# Patient Record
Sex: MALE | Race: WHITE | Employment: FULL TIME | URBAN - METROPOLITAN AREA
[De-identification: names, ages, dates, MRNs, and addresses within clinical notes are randomized per-mention and may not be internally consistent; named-entity substitution may affect disease eponyms.]

---

## 2019-09-13 ENCOUNTER — HOSPITAL ENCOUNTER (OUTPATIENT)
Dept: ULTRASOUND IMAGING | Age: 64
Discharge: HOME | End: 2019-09-13
Attending: FAMILY MEDICINE
Payer: COMMERCIAL

## 2019-09-13 DIAGNOSIS — R22.1 LOCALIZED SWELLING, MASS AND LUMP, NECK: ICD-10-CM

## 2019-09-13 PROCEDURE — 76536 US EXAM OF HEAD AND NECK: CPT

## 2019-09-26 ENCOUNTER — HOSPITAL ENCOUNTER (OUTPATIENT)
Dept: CT IMAGING | Age: 64
Discharge: HOME | End: 2019-09-26
Attending: FAMILY MEDICINE
Payer: COMMERCIAL

## 2019-09-26 DIAGNOSIS — R22.1 NECK MASS: ICD-10-CM

## 2019-09-26 LAB — CREAT UR-MCNC: 0.9 MG/DL (ref 0.6–1.3)

## 2019-09-26 PROCEDURE — 82565 ASSAY OF CREATININE: CPT

## 2019-09-26 PROCEDURE — 74011636320 HC RX REV CODE- 636/320

## 2019-09-26 PROCEDURE — 70491 CT SOFT TISSUE NECK W/DYE: CPT

## 2019-09-26 RX ADMIN — IOPAMIDOL 80 ML: 612 INJECTION, SOLUTION INTRAVENOUS at 16:22

## 2020-09-04 ENCOUNTER — HOSPITAL ENCOUNTER (OUTPATIENT)
Dept: LAB | Age: 65
Discharge: HOME | End: 2020-09-04

## 2020-09-04 LAB — SENTARA SPECIMEN COL,SENBCF: NORMAL

## 2020-09-04 PROCEDURE — 99001 SPECIMEN HANDLING PT-LAB: CPT

## 2021-05-26 ENCOUNTER — HOSPITAL ENCOUNTER (OUTPATIENT)
Dept: VASCULAR SURGERY | Age: 66
Discharge: HOME | End: 2021-05-26
Payer: COMMERCIAL

## 2021-05-26 ENCOUNTER — TRANSCRIBE ORDER (OUTPATIENT)
Dept: SCHEDULING | Age: 66
End: 2021-05-26

## 2021-05-26 DIAGNOSIS — R60.0 LOCALIZED EDEMA: Primary | ICD-10-CM

## 2021-05-26 DIAGNOSIS — R60.0 LOCALIZED EDEMA: ICD-10-CM

## 2021-05-26 PROCEDURE — 93971 EXTREMITY STUDY: CPT

## 2021-09-07 ENCOUNTER — OFFICE VISIT (OUTPATIENT)
Dept: VASCULAR SURGERY | Age: 66
End: 2021-09-07

## 2021-09-07 VITALS
HEIGHT: 75 IN | BODY MASS INDEX: 30.46 KG/M2 | SYSTOLIC BLOOD PRESSURE: 138 MMHG | WEIGHT: 245 LBS | DIASTOLIC BLOOD PRESSURE: 82 MMHG | HEART RATE: 49 BPM | OXYGEN SATURATION: 97 %

## 2021-09-07 DIAGNOSIS — I82.491 DEEP VEIN THROMBOSIS (DVT) OF OTHER VEIN OF RIGHT LOWER EXTREMITY, UNSPECIFIED CHRONICITY (HCC): Primary | ICD-10-CM

## 2021-09-07 PROCEDURE — 99203 OFFICE O/P NEW LOW 30 MIN: CPT | Performed by: NURSE PRACTITIONER

## 2021-09-07 RX ORDER — OMEPRAZOLE 40 MG/1
40 CAPSULE, DELAYED RELEASE ORAL DAILY
COMMUNITY
Start: 2021-07-01

## 2021-09-07 RX ORDER — SILDENAFIL 100 MG/1
TABLET, FILM COATED ORAL
COMMUNITY

## 2021-09-07 RX ORDER — CLOTRIMAZOLE AND BETAMETHASONE DIPROPIONATE 10; .64 MG/G; MG/G
CREAM TOPICAL
COMMUNITY

## 2021-09-07 RX ORDER — HYDROCHLOROTHIAZIDE 12.5 MG/1
CAPSULE ORAL
COMMUNITY

## 2021-09-07 RX ORDER — DIAZEPAM 5 MG/1
TABLET ORAL
COMMUNITY

## 2021-09-07 RX ORDER — METOPROLOL SUCCINATE 50 MG/1
TABLET, EXTENDED RELEASE ORAL
COMMUNITY

## 2021-09-07 RX ORDER — SERTRALINE HYDROCHLORIDE 25 MG/1
TABLET, FILM COATED ORAL
COMMUNITY

## 2021-09-07 RX ORDER — LOSARTAN POTASSIUM 100 MG/1
TABLET ORAL
COMMUNITY

## 2021-09-07 RX ORDER — LOSARTAN POTASSIUM AND HYDROCHLOROTHIAZIDE 12.5; 1 MG/1; MG/1
TABLET ORAL
COMMUNITY

## 2021-09-07 NOTE — PROGRESS NOTES
1. Have you been to an emergency room or urgent care clinic since your last visit? Yes, [facility]     Hospitalized since your last visit? If yes, where, when, and reason for visit? No  2. Have you seen or consulted any other health care providers outside of the [facility] since your last visit including any procedures, health maintenance items. If yes, where, when and reason for visit?  Yes, [facility] ( [facility], over a week ago)

## 2021-10-06 LAB
LEFT CFV RFX: 1.2 S
RIGHT CFV RFX: 2.3 S
RIGHT FV RFX: 2.2 S

## 2021-10-07 DIAGNOSIS — I82.491 DEEP VEIN THROMBOSIS (DVT) OF OTHER VEIN OF RIGHT LOWER EXTREMITY, UNSPECIFIED CHRONICITY (HCC): ICD-10-CM

## 2021-10-08 ENCOUNTER — OFFICE VISIT (OUTPATIENT)
Dept: VASCULAR SURGERY | Age: 66
End: 2021-10-08
Payer: COMMERCIAL

## 2021-10-08 VITALS
HEART RATE: 48 BPM | RESPIRATION RATE: 20 BRPM | DIASTOLIC BLOOD PRESSURE: 80 MMHG | SYSTOLIC BLOOD PRESSURE: 138 MMHG | OXYGEN SATURATION: 100 %

## 2021-10-08 DIAGNOSIS — I82.491 DEEP VEIN THROMBOSIS (DVT) OF OTHER VEIN OF RIGHT LOWER EXTREMITY, UNSPECIFIED CHRONICITY (HCC): Primary | ICD-10-CM

## 2021-10-08 PROCEDURE — 99213 OFFICE O/P EST LOW 20 MIN: CPT | Performed by: NURSE PRACTITIONER

## 2021-10-08 NOTE — PROGRESS NOTES
1. Have you been to an emergency room or urgent care clinic since your last visit? No     Hospitalized since your last visit? If yes, where, when, and reason for visit? No     2. Have you seen or consulted any other health care providers outside of the [facility] since your last visit including any procedures, health maintenance items. If yes, where, when and reason for visit?  No             3 most recent PHQ Screens 10/8/2021   Little interest or pleasure in doing things Not at all   Feeling down, depressed, irritable, or hopeless Not at all   Total Score PHQ 2 0

## 2021-10-08 NOTE — PROGRESS NOTES
Chief Complaint   Patient presents with    Blood Clot         Impression and Plan:  60 y.o. male with provoked RLE DVTs. The DVTs are now chronic. There is deep venous reflux in the posterior tibial and femoral veins. Continue with RLE knee-high compression 20 mmHg to 30 mmHg  Patient made aware that he needs to quit smoking and take breaks every hour and 1/2 to 2 hours on long trips to prevent any other provoked DVTs. We did discuss the signs and symptoms of post thrombotic syndrome which is most likely cause of his edema. Discontinue the the Xarelto. History and Physical    Valerio Vega is a 60y.o. year old male here for review of his RLE PVL DVT rule out. Today he does have complaints of right leg swelling which is alleviated with compression. 9/7/2021 ultrasound   Chronic non-occlusive on present right femoral vein the proximal distal leg lengths, also noted in 1 of two-point tibial veins. Deep venous insufficiency demonstrated the common femoral vein femoral posterior tibial veins right. Triphasic posterior tibial artery noted.     Past Medical History:   Diagnosis Date    Asthma     GERD (gastroesophageal reflux disease)     Hypertension     Thromboembolus ([address]      There is no problem list on file for this patient.     Past Surgical History:   Procedure Laterality Date    HX COLONOSCOPY       Current Outpatient Medications   Medication Sig Dispense Refill    clotrimazole-betamethasone (LOTRISONE) topical cream clotrimazole-betamethasone 1 %-0.05 % topical cream      diazePAM (VALIUM) 5 mg tablet diazepam 5 mg tablet   TAKE 1 TABLET BY MOUTH TWICE DAILY AS NEEDED      losartan-hydroCHLOROthiazide (HYZAAR) 100-12.5 mg per tablet losartan 100 mg-hydrochlorothiazide 12.5 mg tablet   TK 1 T PO QD      metoprolol succinate (TOPROL-XL) 50 mg XL tablet metoprolol succinate ER 50 mg tablet,extended release 24 hr   TAKE 1 TABLET BY MOUTH TWICE DAILY      omeprazole (PRILOSEC) 40 mg capsule Take 40 mg by mouth daily.  rivaroxaban (Xarelto) 20 mg tab tablet Xarelto 20 mg tablet   TAKE 1 TABLET BY MOUTH EVERY DAY      sertraline (ZOLOFT) 25 mg tablet sertraline 25 mg tablet   TAKE 1 TABLET BY MOUTH EVERY OTHER DAY      sildenafil citrate (VIAGRA) 100 mg tablet sildenafil 100 mg tablet   Take 1 tablet every day by oral route as needed.  hydroCHLOROthiazide (MICROZIDE) 12.5 mg capsule hydrochlorothiazide 12.5 mg capsule (Patient not taking: Reported on 9/7/2021)      losartan (COZAAR) 100 mg tablet losartan 100 mg tablet (Patient not taking: Reported on 9/7/2021)       Allergies   Allergen Reactions    Penicillins Other (comments)     Social History     Socioeconomic History    Marital status:      Spouse name: Not on file    Number of children: Not on file    Years of education: Not on file    Highest education level: Not on file   Occupational History    Not on file   Tobacco Use    Smoking status: Current Every Day Smoker     Types: Cigarettes    Smokeless tobacco: Former User   Vaping Use    Vaping Use: Former   Substance and Sexual Activity    Alcohol use: Yes     Comment: weekend drinker    Drug use: Yes     Types: Marijuana     Comment: On the weekends    Sexual activity: Not on file   Other Topics Concern    Not on file   Social History Narrative    Not on file     Social Determinants of Health     Financial Resource Strain:     Difficulty of Paying Living Expenses:    Food Insecurity:     Worried About [facility] in the Last Year:     [facility] in the Last Year:    Transportation Needs:     Lack of Transportation (Medical):      Lack of Transportation (Non-Medical):    Physical Activity:     Days of Exercise per Week:     Minutes of Exercise per Session:    Stress:     Feeling of Stress :    Social Connections:     Frequency of Communication with Friends and Family:     Frequency of Social Gatherings with Friends and Family:     Attends Mandaeism Services:     Active Member of Clubs or Organizations:     Attends Club or Organization Meetings:     Marital Status:    Intimate Partner Violence:     Fear of Current or Ex-Partner:     Emotionally Abused:     Physically Abused:     Sexually Abused:       No family history on file. Review of Systems    General: negative for fever   Eyes: negative for vision loss   HENT: negative for cold symptoms   Respiratory negative for shortness of breath   Cardiac: negative for chest pain   Vascular negative for foot pain at night    Gastrointestinal: negative for abdominal pain   Genitourinary: negative for dysuria    Endocrine: negative for excessive thirst   Skin: negative for rash   Neurological: negative for paralysis   Psychiatric: negative for depression          Physical Exam:    Visit Vitals  /80 (BP 1 Location: Left upper arm, BP Patient Position: Sitting, BP Cuff Size: Adult)   Pulse (!) 48   Resp 20   SpO2 100%      Constitutional:  Patient is well developed, well nourished, and not distressed. HEENT: atraumatic, normocephalic, wearing a mask. Eyes:   Cunjunctivae clear, no scleral icterus  Neck:   No JVD present. Cardiovascular:  Normal rate, regular rhythm, normal heart sounds. No murmur heard. Pulmonary/Chest: Effort normal .  Extremities: Normal range of motion. Neurological:  he  is alert and oriented x3 . Gait normal. Motor & sensory grossly intact in all 4 limbs. Psych: Appropriate mood and affect. Skin:  Skin is warm and dry. No ulcerations. Trace edema RLE  Pulses: Palpable    The treatment plan was reviewed with the patient in detail. The patient voiced understanding of this plan and all questions and concerns were addressed. The patient agrees with this plan. We discussed the signs and symptoms that would require earlier attention or intervention. I appreciate the opportunity to participate in the care of your patient.   I will be sure to keep you informed of any subsequent changes in the treatment plan. If you have any questions or concerns, please feel free to contact me.       [facility]  Vascular Nurse [address]  [phone]